# Patient Record
Sex: FEMALE | URBAN - METROPOLITAN AREA
[De-identification: names, ages, dates, MRNs, and addresses within clinical notes are randomized per-mention and may not be internally consistent; named-entity substitution may affect disease eponyms.]

---

## 2018-05-08 ENCOUNTER — IMPORTED ENCOUNTER (OUTPATIENT)
Dept: URBAN - METROPOLITAN AREA CLINIC 31 | Facility: CLINIC | Age: 58
End: 2018-05-08

## 2018-05-08 PROBLEM — H25.12: Noted: 2018-05-08

## 2018-05-08 PROBLEM — H43.812: Noted: 2018-05-08

## 2018-05-08 PROBLEM — Z96.1: Noted: 2018-05-08

## 2018-05-08 PROCEDURE — 99244 OFF/OP CNSLTJ NEW/EST MOD 40: CPT

## 2018-05-08 NOTE — PATIENT DISCUSSION
1.  Cataract OS: Discussed the risks benefits alternatives and limitations of cataract surgery including infection bleeding loss of vision retinal tears detachment. The patient stated a full understanding and a desire to proceed with the procedure in the left eye. Refractive options were reviewed. Patient has elected to be optimized for near vision in the left eye. The patient will need glasses for distance. 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 DC CL for 7 days prior to admit. High myopia watch for any retinal symptoms postop2. PVD OS: Patient was cautioned to call our office immediately if they experience a substantial change in their symptoms such as an increase in floaters persistent flashes loss of visual field (may appear as a shadow or a curtain) or decrease in visual acuity as these may indicate a retinal tear or detachment. If this is a new problem patient will need to return for re-examination  as determined by the 2050 [employer]. Pseudophakia OD - IOL stable. Monitor. 4. Return for an appointment for [address] [place] [address] with Dr. Rebeka Resendiz.

## 2018-06-06 ENCOUNTER — IMPORTED ENCOUNTER (OUTPATIENT)
Dept: URBAN - METROPOLITAN AREA CLINIC 31 | Facility: CLINIC | Age: 58
End: 2018-06-06

## 2018-06-06 PROBLEM — H25.12: Noted: 2018-06-06

## 2018-06-06 PROCEDURE — 76519 ECHO EXAM OF EYE: CPT

## 2018-06-06 NOTE — PATIENT DISCUSSION
Cataract OS: Discussed the risks benefits alternatives and limitations of cataract surgery including infection bleeding loss of vision retinal tears detachment. The patient stated a full understanding and a desire to proceed with the procedure in the left eye. Refractive options were reviewed. Patient has elected to be optimized for near vision in the left eye. The patient will need glasses for distance.

## 2020-03-04 ENCOUNTER — IMPORTED ENCOUNTER (OUTPATIENT)
Dept: URBAN - METROPOLITAN AREA CLINIC 31 | Facility: CLINIC | Age: 60
End: 2020-03-04

## 2020-03-04 PROBLEM — Z96.1: Noted: 2020-03-04

## 2020-03-04 PROBLEM — H26.491: Noted: 2020-03-04

## 2020-03-04 PROBLEM — H00.14: Noted: 2020-03-04

## 2020-03-04 PROCEDURE — 99214 OFFICE O/P EST MOD 30 MIN: CPT

## 2020-03-04 NOTE — PATIENT DISCUSSION
1.  PCO  OD (Posterior Capsule Opacification)   PCO is visually significant and impairment of vision does not meet the patient’s functional needs or interferes with activities of daily living. Risks benefits and alternatives to the Nd:YAG Laser reviewed including elevated IOP immediately postop and retinal tear/detachment. Patient to notify their ophthalmologist promptly if they have a significant change in symptoms such as flashes of light (photopsia) an increase in floaters loss of visual field or decrease in visual acuity after the procedure. Patient will be scheduled in [place] [id number] for Nd:YAG Laser. 2. Chalazion left upper eyelid:  Treatment with warm compresses and antibiotic and/or steroid drops and ointment was recommended. 3. Pseudophakia OU - IOLs stable. Monitor for changes in vision.

## 2020-03-04 NOTE — PATIENT DISCUSSION
Chalazion left upper eyelid:  Treatment with warm compresses and antibiotic and/or steroid drops and ointment was recommended.

## 2020-12-07 NOTE — PATIENT DISCUSSION
Instructed to call immediately if any new distortion, blurring, decreased vision or eye pain. DR JACOBO Q 8 WEEKS.

## 2021-10-04 NOTE — PROCEDURE NOTE: CLINICAL
PROCEDURE NOTE: Punctal Plugs, Umesh Suarez ([id number], [id number]) #2 Bilateral Lower Lids. Diagnosis: Dry Eye Syndrome. Prior to treatment, the risks/benefits/alternatives were discussed. The patient wished to proceed with procedure. Temporary collagen plugs were inserted. Patient tolerated procedure well. There were no complications. Post procedure instructions given. Anshul Merino

## 2022-04-01 ASSESSMENT — VISUAL ACUITY
OD_CC: 20/40
OS_PH: SC 20/50
OD_SC: 20/80
OS_SC: 20/70
OS_SC: 20/25

## 2022-04-01 ASSESSMENT — TONOMETRY
OD_IOP_MMHG: 24
OS_IOP_MMHG: 22

## 2022-07-09 ENCOUNTER — TELEPHONE ENCOUNTER (OUTPATIENT)
Dept: URBAN - METROPOLITAN AREA CLINIC 121 | Facility: CLINIC | Age: 62
End: 2022-07-09

## 2022-07-09 RX ORDER — MELATONIN 5 MG
CAPSULE ORAL ONCE A DAY
Refills: 0 | OUTPATIENT
Start: 2019-03-07 | End: 2019-09-05

## 2022-07-09 RX ORDER — METOPROLOL TARTRATE 50 MG/1
TABLET, FILM COATED ORAL TWICE A DAY
Refills: 0 | OUTPATIENT
Start: 2019-04-04 | End: 2019-09-05

## 2022-07-09 RX ORDER — LIDOCAINE HCL 4 %
CREAM (GRAM) TOPICAL ONCE A DAY
Refills: 0 | OUTPATIENT
Start: 2019-04-04 | End: 2019-09-05

## 2022-07-09 RX ORDER — ALLOPURINOL 100 MG/1
TABLET ORAL ONCE A DAY
Refills: 0 | OUTPATIENT
Start: 2019-03-07 | End: 2019-04-04

## 2022-07-09 RX ORDER — APIXABAN 2.5 MG/1
TABLET, FILM COATED ORAL ONCE A DAY
Refills: 0 | OUTPATIENT
Start: 2019-03-07 | End: 2019-04-04

## 2022-07-09 RX ORDER — PYRITHIONE ZINC 1 G/ML
SHAMPOO TOPICAL ONCE A DAY
Refills: 0 | OUTPATIENT
Start: 2019-04-04 | End: 2019-09-05

## 2022-07-09 RX ORDER — METOPROLOL SUCCINATE 100 MG/1
TABLET, EXTENDED RELEASE ORAL ONCE A DAY
Refills: 0 | OUTPATIENT
Start: 2019-03-07 | End: 2019-04-04

## 2022-07-09 RX ORDER — PYRITHIONE ZINC 1 G/ML
SHAMPOO TOPICAL ONCE A DAY
Refills: 0 | OUTPATIENT
Start: 2019-03-07 | End: 2019-04-04

## 2022-07-09 RX ORDER — APIXABAN 2.5 MG/1
TABLET, FILM COATED ORAL ONCE A DAY
Refills: 0 | OUTPATIENT
Start: 2019-04-04 | End: 2019-09-05

## 2022-07-09 RX ORDER — BIOTIN 10 MG
TABLET ORAL ONCE A DAY
Refills: 0 | OUTPATIENT
Start: 2019-03-07 | End: 2019-09-05

## 2022-07-09 RX ORDER — LIDOCAINE HCL 4 %
CREAM (GRAM) TOPICAL ONCE A DAY
Refills: 0 | OUTPATIENT
Start: 2019-03-07 | End: 2019-04-04

## 2022-07-09 RX ORDER — AMLODIPINE BESYLATE 10 MG/1
TABLET ORAL ONCE A DAY
Refills: 0 | OUTPATIENT
Start: 2019-03-07 | End: 2019-04-04

## 2022-07-09 RX ORDER — MULTIVITAMIN/IRON/FOLIC ACID 18MG-0.4MG
TABLET ORAL ONCE A DAY
Refills: 0 | OUTPATIENT
Start: 2019-04-04 | End: 2019-09-05

## 2022-07-09 RX ORDER — OLMESARTAN MEDOXOMIL 20 MG/1
TABLET, FILM COATED ORAL ONCE A DAY
Refills: 0 | OUTPATIENT
Start: 2019-03-07 | End: 2019-09-05

## 2022-07-09 RX ORDER — MELATONIN 5 MG
CAPSULE ORAL ONCE A DAY
Refills: 0 | OUTPATIENT
Start: 2019-04-04 | End: 2019-09-05

## 2022-07-10 ENCOUNTER — TELEPHONE ENCOUNTER (OUTPATIENT)
Dept: URBAN - METROPOLITAN AREA CLINIC 121 | Facility: CLINIC | Age: 62
End: 2022-07-10

## 2022-07-10 RX ORDER — MELATONIN 5 MG
CAPSULE ORAL ONCE A DAY
Refills: 0 | Status: ACTIVE | COMMUNITY
Start: 2019-09-05

## 2022-07-10 RX ORDER — OLMESARTAN MEDOXOMIL 20 MG/1
TABLET, FILM COATED ORAL ONCE A DAY
Refills: 0 | Status: ACTIVE | COMMUNITY
Start: 2019-09-05

## 2022-07-10 RX ORDER — ALLOPURINOL 100 MG/1
TABLET ORAL ONCE A DAY
Refills: 0 | Status: ACTIVE | COMMUNITY
Start: 2019-04-04

## 2022-07-10 RX ORDER — AMLODIPINE BESYLATE 5 MG/1
TABLET ORAL TWICE A DAY
Refills: 0 | Status: ACTIVE | COMMUNITY
Start: 2019-04-04

## 2022-07-10 RX ORDER — BIOTIN 10 MG
TABLET ORAL ONCE A DAY
Refills: 0 | Status: ACTIVE | COMMUNITY
Start: 2019-09-05

## 2022-07-10 RX ORDER — MULTIVITAMIN/IRON/FOLIC ACID 18MG-0.4MG
TABLET ORAL ONCE A DAY
Refills: 0 | Status: ACTIVE | COMMUNITY
Start: 2019-09-05

## 2022-07-10 RX ORDER — APIXABAN 2.5 MG/1
TABLET, FILM COATED ORAL ONCE A DAY
Refills: 0 | Status: ACTIVE | COMMUNITY
Start: 2019-09-05

## 2022-07-10 RX ORDER — PYRITHIONE ZINC 1 G/ML
SHAMPOO TOPICAL ONCE A DAY
Refills: 0 | Status: ACTIVE | COMMUNITY
Start: 2019-09-05

## 2022-07-10 RX ORDER — METOPROLOL TARTRATE 50 MG/1
TABLET, FILM COATED ORAL TWICE A DAY
Refills: 0 | Status: ACTIVE | COMMUNITY
Start: 2019-09-05

## 2022-07-10 RX ORDER — LIDOCAINE HCL 4 %
CREAM (GRAM) TOPICAL ONCE A DAY
Refills: 0 | Status: ACTIVE | COMMUNITY
Start: 2019-09-05

## 2022-12-15 NOTE — PATIENT DISCUSSION
Had plugs in the past and they didn't help. Possible treatments include: warm compresses with microwavable eye mask for 10 minutes and take HydroEye or 3,000 mg flaxseed or fish oil by mouth daily. Recommended artificial tears to use as needed.

## 2022-12-15 NOTE — PATIENT DISCUSSION
Continue with Dr Gianni Pan, discussed limitations of glasses and short time before DL will be invalid.